# Patient Record
Sex: MALE | Race: WHITE | NOT HISPANIC OR LATINO | Employment: UNEMPLOYED | RURAL
[De-identification: names, ages, dates, MRNs, and addresses within clinical notes are randomized per-mention and may not be internally consistent; named-entity substitution may affect disease eponyms.]

---

## 2020-12-19 ENCOUNTER — HISTORICAL (OUTPATIENT)
Dept: ADMINISTRATIVE | Facility: HOSPITAL | Age: 50
End: 2020-12-19

## 2020-12-19 LAB
ALBUMIN SERPL BCP-MCNC: 3.2 G/DL (ref 3.5–5)
ALBUMIN/GLOB SERPL: 0.8 {RATIO}
ALP SERPL-CCNC: 124 U/L (ref 45–115)
ALT SERPL W P-5'-P-CCNC: 57 U/L (ref 16–61)
ANION GAP SERPL CALCULATED.3IONS-SCNC: 15 MMOL/L
AST SERPL W P-5'-P-CCNC: 33 U/L (ref 15–37)
BASOPHILS # BLD AUTO: 0.01 X10E3/UL (ref 0–0.2)
BASOPHILS NFR BLD AUTO: 0.2 % (ref 0–1)
BILIRUB SERPL-MCNC: 0.4 MG/DL (ref 0–1.2)
BUN SERPL-MCNC: 11 MG/DL (ref 7–18)
BUN/CREAT SERPL: 12.1
CALCIUM SERPL-MCNC: 8.3 MG/DL (ref 8.5–10.1)
CHLORIDE SERPL-SCNC: 99 MMOL/L (ref 98–107)
CO2 SERPL-SCNC: 25 MMOL/L (ref 21–32)
CREAT SERPL-MCNC: 0.91 MG/DL (ref 0.7–1.3)
EOSINOPHIL # BLD AUTO: 0.02 X10E3/UL (ref 0–0.5)
EOSINOPHIL NFR BLD AUTO: 0.3 % (ref 1–4)
ERYTHROCYTE [DISTWIDTH] IN BLOOD BY AUTOMATED COUNT: 11.6 % (ref 11.5–14.5)
GLOBULIN SER-MCNC: 3.9 G/DL (ref 2–4)
GLUCOSE SERPL-MCNC: 106 MG/DL (ref 74–106)
HCT VFR BLD AUTO: 39.9 % (ref 40–54)
HGB BLD-MCNC: 13.9 G/DL (ref 13.5–18)
IMM GRANULOCYTES # BLD AUTO: 0.07 X10E3/UL (ref 0–0.04)
IMM GRANULOCYTES NFR BLD: 1.1 % (ref 0–0.4)
LYMPHOCYTES # BLD AUTO: 0.77 X10E3/UL (ref 1–4.8)
LYMPHOCYTES NFR BLD AUTO: 11.9 % (ref 27–41)
MCH RBC QN AUTO: 31 PG (ref 27–31)
MCHC RBC AUTO-ENTMCNC: 34.8 G/DL (ref 32–36)
MCV RBC AUTO: 89.1 FL (ref 80–96)
MONOCYTES # BLD AUTO: 0.89 X10E3/UL (ref 0–0.8)
MONOCYTES NFR BLD AUTO: 13.7 % (ref 2–6)
MPC BLD CALC-MCNC: 9.9 FL (ref 9.4–12.4)
NEUTROPHILS # BLD AUTO: 4.73 X10E3/UL (ref 1.8–7.7)
NEUTROPHILS NFR BLD AUTO: 72.8 % (ref 53–65)
NRBC # BLD AUTO: 0 X10E3/UL (ref 0–0)
NRBC, AUTO (.00): 0 /100 (ref 0–0)
PLATELET # BLD AUTO: 251 X10E3/UL (ref 150–400)
POTASSIUM SERPL-SCNC: 3.6 MMOL/L (ref 3.5–5.1)
PROT SERPL-MCNC: 7.1 G/DL (ref 6.4–8.2)
RBC # BLD AUTO: 4.48 X10E6/UL (ref 4.6–6.2)
SODIUM SERPL-SCNC: 135 MMOL/L (ref 136–145)
WBC # BLD AUTO: 6.49 X10E3/UL (ref 4.5–11)

## 2022-05-09 ENCOUNTER — APPOINTMENT (OUTPATIENT)
Dept: RADIOLOGY | Facility: CLINIC | Age: 52
End: 2022-05-09
Attending: FAMILY MEDICINE
Payer: MEDICARE

## 2022-05-09 ENCOUNTER — OFFICE VISIT (OUTPATIENT)
Dept: FAMILY MEDICINE | Facility: CLINIC | Age: 52
End: 2022-05-09
Payer: MEDICARE

## 2022-05-09 VITALS
RESPIRATION RATE: 18 BRPM | OXYGEN SATURATION: 98 % | DIASTOLIC BLOOD PRESSURE: 70 MMHG | HEIGHT: 71 IN | HEART RATE: 61 BPM | SYSTOLIC BLOOD PRESSURE: 112 MMHG | TEMPERATURE: 98 F

## 2022-05-09 DIAGNOSIS — G89.29 CHRONIC MIDLINE LOW BACK PAIN WITH SCIATICA, SCIATICA LATERALITY UNSPECIFIED: ICD-10-CM

## 2022-05-09 DIAGNOSIS — M54.40 CHRONIC MIDLINE LOW BACK PAIN WITH SCIATICA, SCIATICA LATERALITY UNSPECIFIED: ICD-10-CM

## 2022-05-09 DIAGNOSIS — R07.9 CHEST PAIN, UNSPECIFIED TYPE: ICD-10-CM

## 2022-05-09 DIAGNOSIS — F32.A DEPRESSION, UNSPECIFIED DEPRESSION TYPE: ICD-10-CM

## 2022-05-09 DIAGNOSIS — R07.9 CHEST PAIN, UNSPECIFIED TYPE: Primary | ICD-10-CM

## 2022-05-09 DIAGNOSIS — Z12.11 SCREENING FOR MALIGNANT NEOPLASM OF COLON: ICD-10-CM

## 2022-05-09 LAB
EKG 12-LEAD: NORMAL
PR INTERVAL: NORMAL
PRT AXES: NORMAL
QRS DURATION: NORMAL
QT/QTC: NORMAL
VENTRICULAR RATE: NORMAL

## 2022-05-09 PROCEDURE — 71046 X-RAY EXAM CHEST 2 VIEWS: CPT | Mod: TC,RHCUB | Performed by: FAMILY MEDICINE

## 2022-05-09 PROCEDURE — 93010 ELECTROCARDIOGRAM REPORT: CPT | Mod: ,,, | Performed by: FAMILY MEDICINE

## 2022-05-09 PROCEDURE — 99214 PR OFFICE/OUTPT VISIT, EST, LEVL IV, 30-39 MIN: ICD-10-PCS | Mod: ,,, | Performed by: FAMILY MEDICINE

## 2022-05-09 PROCEDURE — 71046 XR CHEST PA AND LATERAL: ICD-10-PCS | Mod: 26,,, | Performed by: RADIOLOGY

## 2022-05-09 PROCEDURE — 93010 PR ELECTROCARDIOGRAM REPORT: ICD-10-PCS | Mod: ,,, | Performed by: FAMILY MEDICINE

## 2022-05-09 PROCEDURE — 96372 THER/PROPH/DIAG INJ SC/IM: CPT | Mod: ,,, | Performed by: FAMILY MEDICINE

## 2022-05-09 PROCEDURE — 99214 OFFICE O/P EST MOD 30 MIN: CPT | Mod: ,,, | Performed by: FAMILY MEDICINE

## 2022-05-09 PROCEDURE — 93005 ELECTROCARDIOGRAM TRACING: CPT | Mod: RHCUB | Performed by: FAMILY MEDICINE

## 2022-05-09 PROCEDURE — 71046 X-RAY EXAM CHEST 2 VIEWS: CPT | Mod: 26,,, | Performed by: RADIOLOGY

## 2022-05-09 PROCEDURE — 96372 PR INJECTION,THERAP/PROPH/DIAG2ST, IM OR SUBCUT: ICD-10-PCS | Mod: ,,, | Performed by: FAMILY MEDICINE

## 2022-05-09 RX ORDER — DULOXETIN HYDROCHLORIDE 30 MG/1
CAPSULE, DELAYED RELEASE ORAL
Qty: 60 CAPSULE | Refills: 1 | Status: SHIPPED | OUTPATIENT
Start: 2022-05-09 | End: 2023-07-11

## 2022-05-09 RX ORDER — KETOROLAC TROMETHAMINE 30 MG/ML
60 INJECTION, SOLUTION INTRAMUSCULAR; INTRAVENOUS
Status: COMPLETED | OUTPATIENT
Start: 2022-05-09 | End: 2022-05-09

## 2022-05-09 RX ORDER — DULOXETIN HYDROCHLORIDE 30 MG/1
CAPSULE, DELAYED RELEASE ORAL
Qty: 30 CAPSULE | Refills: 11 | Status: SHIPPED | OUTPATIENT
Start: 2022-05-09 | End: 2022-05-09

## 2022-05-09 RX ADMIN — KETOROLAC TROMETHAMINE 60 MG: 30 INJECTION, SOLUTION INTRAMUSCULAR; INTRAVENOUS at 10:05

## 2022-05-09 NOTE — PROGRESS NOTES
Silas German is a 52 y.o. male seen today for symptoms of increased depressed in and increased left-sided chest discomfort.  Patient reports certain movements will aggravate his chest discomfort and his EKG here in the office was negative.  In the past he is also had a negative cardiac evaluation.  Patient does suffer from chronic neck and lumbar disc disease.  Patient is planning a neurosurgery evaluation in [place].  Patient reports his chronic pain syndrome is making him very depressed and fatigued and he does agree for pain management evaluation today.  We discussed a trial of Cymbalta for his chronic depression and also hopefully to help with his pain syndrome.    Past Medical History:   Diagnosis Date    Depression     Hyperlipidemia     Insomnia      Family History   Problem Relation Age of Onset    Cancer Maternal Grandfather     Heart attack Maternal Grandfather      No current outpatient medications on file prior to visit.     No current facility-administered medications on file prior to visit.       There is no immunization history on file for this patient.    Review of Systems   Constitutional: Positive for malaise/fatigue. Negative for fever and weight loss.   Respiratory: Negative for wheezing.    Cardiovascular: Positive for chest pain. Negative for palpitations.   Gastrointestinal: Negative for nausea and vomiting.   Musculoskeletal: Positive for back pain, joint pain, myalgias and neck pain.   Psychiatric/Behavioral: Positive for depression. Negative for suicidal ideas. The patient is nervous/anxious.         Vitals:    05/09/22 0903   BP: 112/70   Pulse: 61   Resp: 18   Temp: 98.1 °F (36.7 °C)       Physical Exam  Vitals (Patient appears uncomfortable and tearful at times.) reviewed.   Constitutional:       General: He is in acute distress.   HENT:      Head: Normocephalic.   Cardiovascular:      Rate and Rhythm: Normal rate and regular rhythm.      Heart sounds: Normal heart sounds. No  murmur heard.    No friction rub. No gallop.   Pulmonary:      Effort: Pulmonary effort is normal.      Breath sounds: Normal breath sounds. No wheezing, rhonchi or rales.   Musculoskeletal:      Cervical back: Spasms and tenderness present. Decreased range of motion.      Lumbar back: Spasms and tenderness present. Decreased range of motion.        Back:    Skin:     General: Skin is warm and dry.   Neurological:      Mental Status: He is alert.      Cranial Nerves: No cranial nerve deficit.          Assessment and Plan  Chest pain, unspecified type  -     POCT EKG 12-LEAD (NOT FOR [facility] USE)  -     X-Ray Chest PA And Lateral; Future; Expected date: 05/09/2022  -     X-Ray Chest PA And Lateral; Future; Expected date: 05/09/2022  -     ketorolac injection 60 mg    Screening for malignant neoplasm of colon  -     Ambulatory referral/consult to Gastroenterology; Future; Expected date: 05/16/2022    Chronic midline low back pain with sciatica, sciatica laterality unspecified  -     Ambulatory referral/consult to Pain Clinic; Future; Expected date: 05/16/2022  -     ketorolac injection 60 mg    Depression, unspecified depression type  -     Discontinue: DULoxetine (CYMBALTA) 30 MG capsule; Take 1 po qd for 1 week then  Take 2 daily.  Dispense: 30 capsule; Refill: 11  -     DULoxetine (CYMBALTA) 30 MG capsule; Take 1 po qd for 1 week then  Take 2 daily.  Dispense: 60 capsule; Refill: 1            Return to clinic in 1 month.  The patient left before his x-ray was read.    Health Maintenance Topics with due status: Not Due       Topic Last Completion Date    Influenza Vaccine Not Due

## 2022-05-10 ENCOUNTER — LAB VISIT (OUTPATIENT)
Dept: LAB | Facility: CLINIC | Age: 52
End: 2022-05-10
Payer: MEDICARE

## 2022-05-10 DIAGNOSIS — Z71.89 COMPLEX CARE COORDINATION: ICD-10-CM

## 2022-05-10 DIAGNOSIS — R07.9 CHEST PAIN, UNSPECIFIED TYPE: Primary | ICD-10-CM

## 2022-05-10 DIAGNOSIS — R53.83 FATIGUE, UNSPECIFIED TYPE: ICD-10-CM

## 2022-05-10 LAB
ALBUMIN SERPL BCP-MCNC: 3.9 G/DL (ref 3.5–5)
ALBUMIN/GLOB SERPL: 1.1 {RATIO}
ALP SERPL-CCNC: 111 U/L (ref 45–115)
ALT SERPL W P-5'-P-CCNC: 51 U/L (ref 16–61)
ANION GAP SERPL CALCULATED.3IONS-SCNC: 14 MMOL/L (ref 7–16)
AST SERPL W P-5'-P-CCNC: 22 U/L (ref 15–37)
BASOPHILS # BLD AUTO: 0.03 K/UL (ref 0–0.2)
BASOPHILS NFR BLD AUTO: 0.3 % (ref 0–1)
BILIRUB SERPL-MCNC: 0.4 MG/DL (ref 0–1.2)
BUN SERPL-MCNC: 22 MG/DL (ref 7–18)
BUN/CREAT SERPL: 19 (ref 6–20)
CALCIUM SERPL-MCNC: 8.8 MG/DL (ref 8.5–10.1)
CHLORIDE SERPL-SCNC: 106 MMOL/L (ref 98–107)
CHOLEST SERPL-MCNC: 212 MG/DL (ref 0–200)
CHOLEST/HDLC SERPL: 3.8 {RATIO}
CO2 SERPL-SCNC: 24 MMOL/L (ref 21–32)
CREAT SERPL-MCNC: 1.13 MG/DL (ref 0.7–1.3)
DIFFERENTIAL METHOD BLD: ABNORMAL
EOSINOPHIL # BLD AUTO: 0.07 K/UL (ref 0–0.5)
EOSINOPHIL NFR BLD AUTO: 0.8 % (ref 1–4)
ERYTHROCYTE [DISTWIDTH] IN BLOOD BY AUTOMATED COUNT: 12.4 % (ref 11.5–14.5)
GLOBULIN SER-MCNC: 3.5 G/DL (ref 2–4)
GLUCOSE SERPL-MCNC: 97 MG/DL (ref 74–106)
HCT VFR BLD AUTO: 51.6 % (ref 40–54)
HDLC SERPL-MCNC: 56 MG/DL (ref 40–60)
HGB BLD-MCNC: 17.4 G/DL (ref 13.5–18)
IMM GRANULOCYTES # BLD AUTO: 0.09 K/UL (ref 0–0.04)
IMM GRANULOCYTES NFR BLD: 1 % (ref 0–0.4)
LDLC SERPL CALC-MCNC: 131 MG/DL
LDLC/HDLC SERPL: 2.3 {RATIO}
LYMPHOCYTES # BLD AUTO: 1.25 K/UL (ref 1–4.8)
LYMPHOCYTES NFR BLD AUTO: 14.3 % (ref 27–41)
MCH RBC QN AUTO: 31.2 PG (ref 27–31)
MCHC RBC AUTO-ENTMCNC: 33.7 G/DL (ref 32–36)
MCV RBC AUTO: 92.5 FL (ref 80–96)
MONOCYTES # BLD AUTO: 0.64 K/UL (ref 0–0.8)
MONOCYTES NFR BLD AUTO: 7.3 % (ref 2–6)
MPC BLD CALC-MCNC: 10.2 FL (ref 9.4–12.4)
NEUTROPHILS # BLD AUTO: 6.65 K/UL (ref 1.8–7.7)
NEUTROPHILS NFR BLD AUTO: 76.3 % (ref 53–65)
NONHDLC SERPL-MCNC: 156 MG/DL
NRBC # BLD AUTO: 0 X10E3/UL
NRBC, AUTO (.00): 0 %
PLATELET # BLD AUTO: 302 K/UL (ref 150–400)
POTASSIUM SERPL-SCNC: 5.4 MMOL/L (ref 3.5–5.1)
PROT SERPL-MCNC: 7.4 G/DL (ref 6.4–8.2)
RBC # BLD AUTO: 5.58 M/UL (ref 4.6–6.2)
SODIUM SERPL-SCNC: 139 MMOL/L (ref 136–145)
TRIGL SERPL-MCNC: 125 MG/DL (ref 35–150)
TSH SERPL DL<=0.005 MIU/L-ACNC: 1.17 UIU/ML (ref 0.36–3.74)
VLDLC SERPL-MCNC: 25 MG/DL
WBC # BLD AUTO: 8.73 K/UL (ref 4.5–11)

## 2022-05-10 PROCEDURE — 80053 COMPREHEN METABOLIC PANEL: CPT | Mod: ,,, | Performed by: CLINICAL MEDICAL LABORATORY

## 2022-05-10 PROCEDURE — 85025 COMPLETE CBC W/AUTO DIFF WBC: CPT | Mod: ,,, | Performed by: CLINICAL MEDICAL LABORATORY

## 2022-05-10 PROCEDURE — 80061 LIPID PANEL: CPT | Mod: ,,, | Performed by: CLINICAL MEDICAL LABORATORY

## 2022-05-10 PROCEDURE — 84402 ASSAY OF FREE TESTOSTERONE: CPT | Mod: 90,,, | Performed by: CLINICAL MEDICAL LABORATORY

## 2022-05-10 PROCEDURE — 84403 ASSAY OF TOTAL TESTOSTERONE: CPT | Mod: 90,,, | Performed by: CLINICAL MEDICAL LABORATORY

## 2022-05-10 PROCEDURE — 84403 TESTOSTERONE, FREE (DIALYSIS) AND TOTAL, LC/MS/MS: ICD-10-PCS | Mod: 90,,, | Performed by: CLINICAL MEDICAL LABORATORY

## 2022-05-10 PROCEDURE — 36415 COLL VENOUS BLD VENIPUNCTURE: CPT

## 2022-05-10 PROCEDURE — 84443 TSH: ICD-10-PCS | Mod: ,,, | Performed by: CLINICAL MEDICAL LABORATORY

## 2022-05-10 PROCEDURE — 84402 TESTOSTERONE, FREE (DIALYSIS) AND TOTAL, LC/MS/MS: ICD-10-PCS | Mod: 90,,, | Performed by: CLINICAL MEDICAL LABORATORY

## 2022-05-10 PROCEDURE — 85025 CBC WITH DIFFERENTIAL: ICD-10-PCS | Mod: ,,, | Performed by: CLINICAL MEDICAL LABORATORY

## 2022-05-10 PROCEDURE — 80053 COMPREHENSIVE METABOLIC PANEL: ICD-10-PCS | Mod: ,,, | Performed by: CLINICAL MEDICAL LABORATORY

## 2022-05-10 PROCEDURE — 84443 ASSAY THYROID STIM HORMONE: CPT | Mod: ,,, | Performed by: CLINICAL MEDICAL LABORATORY

## 2022-05-10 PROCEDURE — 80061 LIPID PANEL: ICD-10-PCS | Mod: ,,, | Performed by: CLINICAL MEDICAL LABORATORY

## 2022-05-12 ENCOUNTER — TELEPHONE (OUTPATIENT)
Dept: FAMILY MEDICINE | Facility: CLINIC | Age: 52
End: 2022-05-12
Payer: MEDICARE

## 2022-05-12 NOTE — TELEPHONE ENCOUNTER
----- Message from Darion Grant MD sent at 5/9/2022 12:45 PM CDT -----  He is normal lungs and heart but has degenerative changes present in the thoracic spine

## 2022-05-14 LAB
TESTOST FREE SERPL-MCNC: 12.1 NG/DL (ref 4.06–15.6)
TESTOST SERPL-MCNC: 525 NG/DL (ref 240–950)

## 2022-06-15 ENCOUNTER — TELEPHONE (OUTPATIENT)
Dept: FAMILY MEDICINE | Facility: CLINIC | Age: 52
End: 2022-06-15
Payer: MEDICARE

## 2022-06-15 NOTE — TELEPHONE ENCOUNTER
----- Message from Darion Grant MD sent at 5/27/2022  8:03 AM CDT -----  Testosterone studies are normal

## 2022-12-09 DIAGNOSIS — Z71.89 COMPLEX CARE COORDINATION: ICD-10-CM

## 2023-07-11 ENCOUNTER — OFFICE VISIT (OUTPATIENT)
Dept: FAMILY MEDICINE | Facility: CLINIC | Age: 53
End: 2023-07-11
Payer: MEDICARE

## 2023-07-11 VITALS
BODY MASS INDEX: 31.59 KG/M2 | RESPIRATION RATE: 16 BRPM | SYSTOLIC BLOOD PRESSURE: 134 MMHG | HEIGHT: 71 IN | HEART RATE: 74 BPM | OXYGEN SATURATION: 98 % | TEMPERATURE: 98 F | DIASTOLIC BLOOD PRESSURE: 90 MMHG | WEIGHT: 225.63 LBS

## 2023-07-11 DIAGNOSIS — Z13.220 LIPID SCREENING: ICD-10-CM

## 2023-07-11 DIAGNOSIS — R00.2 PALPITATIONS: ICD-10-CM

## 2023-07-11 DIAGNOSIS — Z12.11 COLON CANCER SCREENING: ICD-10-CM

## 2023-07-11 DIAGNOSIS — R56.9 SEIZURES: ICD-10-CM

## 2023-07-11 DIAGNOSIS — Z13.29 THYROID DISORDER SCREENING: ICD-10-CM

## 2023-07-11 DIAGNOSIS — Z11.59 NEED FOR HEPATITIS C SCREENING TEST: Primary | ICD-10-CM

## 2023-07-11 DIAGNOSIS — E78.5 HYPERLIPIDEMIA, UNSPECIFIED HYPERLIPIDEMIA TYPE: ICD-10-CM

## 2023-07-11 LAB
ALBUMIN SERPL BCP-MCNC: 4 G/DL (ref 3.5–5)
ALBUMIN/GLOB SERPL: 1.3 {RATIO}
ALP SERPL-CCNC: 106 U/L (ref 45–115)
ALT SERPL W P-5'-P-CCNC: 27 U/L (ref 16–61)
ANION GAP SERPL CALCULATED.3IONS-SCNC: 12 MMOL/L (ref 7–16)
AST SERPL W P-5'-P-CCNC: 23 U/L (ref 15–37)
BASOPHILS # BLD AUTO: 0.04 K/UL (ref 0–0.2)
BASOPHILS NFR BLD AUTO: 0.6 % (ref 0–1)
BILIRUB SERPL-MCNC: 0.3 MG/DL (ref ?–1.2)
BUN SERPL-MCNC: 21 MG/DL (ref 7–18)
BUN/CREAT SERPL: 21 (ref 6–20)
CALCIUM SERPL-MCNC: 8.5 MG/DL (ref 8.5–10.1)
CHLORIDE SERPL-SCNC: 108 MMOL/L (ref 98–107)
CHOLEST SERPL-MCNC: 229 MG/DL (ref 0–200)
CHOLEST/HDLC SERPL: 4.2 {RATIO}
CO2 SERPL-SCNC: 24 MMOL/L (ref 21–32)
CREAT SERPL-MCNC: 1.01 MG/DL (ref 0.7–1.3)
DIFFERENTIAL METHOD BLD: ABNORMAL
EGFR (NO RACE VARIABLE) (RUSH/TITUS): 89 ML/MIN/1.73M2
EOSINOPHIL # BLD AUTO: 0.19 K/UL (ref 0–0.5)
EOSINOPHIL NFR BLD AUTO: 3 % (ref 1–4)
ERYTHROCYTE [DISTWIDTH] IN BLOOD BY AUTOMATED COUNT: 12.5 % (ref 11.5–14.5)
GLOBULIN SER-MCNC: 3.1 G/DL (ref 2–4)
GLUCOSE SERPL-MCNC: 78 MG/DL (ref 74–106)
HCT VFR BLD AUTO: 45.5 % (ref 40–54)
HCV AB SER QL: NORMAL
HDLC SERPL-MCNC: 55 MG/DL (ref 40–60)
HGB BLD-MCNC: 15.1 G/DL (ref 13.5–18)
IMM GRANULOCYTES # BLD AUTO: 0.05 K/UL (ref 0–0.04)
IMM GRANULOCYTES NFR BLD: 0.8 % (ref 0–0.4)
LDLC SERPL CALC-MCNC: 150 MG/DL
LDLC/HDLC SERPL: 2.7 {RATIO}
LYMPHOCYTES # BLD AUTO: 1.19 K/UL (ref 1–4.8)
LYMPHOCYTES NFR BLD AUTO: 18.7 % (ref 27–41)
MCH RBC QN AUTO: 30 PG (ref 27–31)
MCHC RBC AUTO-ENTMCNC: 33.2 G/DL (ref 32–36)
MCV RBC AUTO: 90.3 FL (ref 80–96)
MONOCYTES # BLD AUTO: 0.58 K/UL (ref 0–0.8)
MONOCYTES NFR BLD AUTO: 9.1 % (ref 2–6)
MPC BLD CALC-MCNC: 9.8 FL (ref 9.4–12.4)
NEUTROPHILS # BLD AUTO: 4.3 K/UL (ref 1.8–7.7)
NEUTROPHILS NFR BLD AUTO: 67.8 % (ref 53–65)
NONHDLC SERPL-MCNC: 174 MG/DL
NRBC # BLD AUTO: 0 X10E3/UL
NRBC, AUTO (.00): 0 %
PLATELET # BLD AUTO: 249 K/UL (ref 150–400)
POTASSIUM SERPL-SCNC: 4.2 MMOL/L (ref 3.5–5.1)
PROT SERPL-MCNC: 7.1 G/DL (ref 6.4–8.2)
RBC # BLD AUTO: 5.04 M/UL (ref 4.6–6.2)
SODIUM SERPL-SCNC: 140 MMOL/L (ref 136–145)
TRIGL SERPL-MCNC: 120 MG/DL (ref 35–150)
TSH SERPL DL<=0.005 MIU/L-ACNC: 1.96 UIU/ML (ref 0.36–3.74)
VLDLC SERPL-MCNC: 24 MG/DL
WBC # BLD AUTO: 6.35 K/UL (ref 4.5–11)

## 2023-07-11 PROCEDURE — 80061 LIPID PANEL: ICD-10-PCS | Mod: ,,, | Performed by: CLINICAL MEDICAL LABORATORY

## 2023-07-11 PROCEDURE — 85025 COMPLETE CBC W/AUTO DIFF WBC: CPT | Mod: ,,, | Performed by: CLINICAL MEDICAL LABORATORY

## 2023-07-11 PROCEDURE — 86803 HEPATITIS C ANTIBODY: ICD-10-PCS | Mod: ,,, | Performed by: CLINICAL MEDICAL LABORATORY

## 2023-07-11 PROCEDURE — 80053 COMPREHEN METABOLIC PANEL: CPT | Mod: ,,, | Performed by: CLINICAL MEDICAL LABORATORY

## 2023-07-11 PROCEDURE — 80053 COMPREHENSIVE METABOLIC PANEL: ICD-10-PCS | Mod: ,,, | Performed by: CLINICAL MEDICAL LABORATORY

## 2023-07-11 PROCEDURE — 99213 PR OFFICE/OUTPT VISIT, EST, LEVL III, 20-29 MIN: ICD-10-PCS | Mod: ,,, | Performed by: FAMILY MEDICINE

## 2023-07-11 PROCEDURE — 84443 ASSAY THYROID STIM HORMONE: CPT | Mod: ,,, | Performed by: CLINICAL MEDICAL LABORATORY

## 2023-07-11 PROCEDURE — 86803 HEPATITIS C AB TEST: CPT | Mod: ,,, | Performed by: CLINICAL MEDICAL LABORATORY

## 2023-07-11 PROCEDURE — 99213 OFFICE O/P EST LOW 20 MIN: CPT | Mod: ,,, | Performed by: FAMILY MEDICINE

## 2023-07-11 PROCEDURE — 80061 LIPID PANEL: CPT | Mod: ,,, | Performed by: CLINICAL MEDICAL LABORATORY

## 2023-07-11 PROCEDURE — 85025 CBC WITH DIFFERENTIAL: ICD-10-PCS | Mod: ,,, | Performed by: CLINICAL MEDICAL LABORATORY

## 2023-07-11 PROCEDURE — 84443 TSH: ICD-10-PCS | Mod: ,,, | Performed by: CLINICAL MEDICAL LABORATORY

## 2023-07-11 RX ORDER — NEOMYCIN SULFATE, POLYMYXIN B SULFATE AND DEXAMETHASONE 3.5; 10000; 1 MG/ML; [USP'U]/ML; MG/ML
1 SUSPENSION/ DROPS OPHTHALMIC 3 TIMES DAILY
COMMUNITY
Start: 2023-07-07 | End: 2023-07-11

## 2023-07-11 NOTE — PROGRESS NOTES
"      Yajaira Mejias MD MPH  [address], [place] [address]  Phone: [phone]     Subjective     Name: Silas German   YOB: 1970 (53 y.o.)  MRN: [id number]  Visit Date: 7/11/2023   Chief Complaint: Establish Care        HISTORY OF PRESENT ILLNESS:  Patient is 53 years male. He came to the office to establish care. He has past medical history of hyperlipidemia and depression. He is currently not taking any medications. He reported few seizure like episodes in recent past for that he didn't seek medical help. I have referred him to a neurologist for work up and counseled him not to drive any vehicle. He is also complaining of numbness in plantar aspect of both his feet. He denies any weakness. He has history of thyroid disease for which he has not seen any doctor for more than a decade. Patient has been having problem with his stools lately, he says that his stool is not formed and is "pasty" in consistency. He denies any abdominal pain, blood in stool, or alternating stool pattern.         PAST MEDICAL HISTORY:  Significant Diagnoses - Patient  has a past medical history of Depression, Hyperlipidemia, and Insomnia.  Medications - Patient is not on any long-term medications.   Allergies - Patient has No Known Allergies.  Surgeries - Patient  has a past surgical history that includes Hand surgery and Tonsillectomy.  Family History - Patient family history includes Cancer in his maternal grandfather; Heart attack in his maternal grandfather.      SOCIAL HISTORY:  Tobacco - Patient  reports that he has quit smoking. His smoking use included cigarettes. He has never used smokeless tobacco.   Alcohol - Patient  reports that he does not currently use alcohol.   Recreational Drugs - Patient  reports that he does not currently use drugs after having used the following drugs: Methamphetamines.       Review of Systems   Constitutional: Negative.    HENT: Negative.     Eyes: Negative.    Respiratory: " " Negative for cough and shortness of breath.    Cardiovascular:  Positive for palpitations. Negative for chest pain and leg swelling.   Gastrointestinal:  Positive for change in bowel habit and change in bowel habit. Negative for abdominal pain.   Endocrine: Negative for cold intolerance, heat intolerance, polydipsia and polyuria.   Genitourinary:  Negative for decreased urine volume, difficulty urinating, dysuria and urgency.   Musculoskeletal:  Positive for arthralgias.   Allergic/Immunologic: Negative.    Neurological:  Positive for seizures and numbness. Negative for dizziness, vertigo, tremors, syncope, weakness, light-headedness, headaches, coordination difficulties and coordination difficulties.   Psychiatric/Behavioral:  Negative for agitation, behavioral problems, confusion, decreased concentration and sleep disturbance. The patient is not nervous/anxious and is not hyperactive.         Past Medical History:   Diagnosis Date    Depression     Hyperlipidemia     Insomnia         Review of patient's allergies indicates:  No Known Allergies     Past Surgical History:   Procedure Laterality Date    HAND SURGERY      TONSILLECTOMY          Family History   Problem Relation Age of Onset    Cancer Maternal Grandfather     Heart attack Maternal Grandfather        No current outpatient medications       Objective     BP (!) 134/90 (BP Location: Left arm, Patient Position: Sitting, BP Method: Large (Automatic))   Pulse 74   Temp 98.2 °F (36.8 °C) (Temporal)   Resp 16   Ht 5' 11" (1.803 m)   Wt 102.3 kg (225 lb 9.6 oz)   SpO2 98%   BMI 31.46 kg/m²     Physical Exam  Constitutional:       Appearance: Normal appearance. He is obese.   HENT:      Head: Normocephalic and atraumatic.      Mouth/Throat:      Mouth: Mucous membranes are moist.   Eyes:      Extraocular Movements: Extraocular movements intact.      Pupils: Pupils are equal, round, and reactive to light.   Cardiovascular:      Rate and Rhythm: Normal " rate and regular rhythm.      Pulses: Normal pulses.      Heart sounds: Normal heart sounds.   Pulmonary:      Effort: Pulmonary effort is normal.      Breath sounds: Normal breath sounds.   Abdominal:      Palpations: Abdomen is soft.   Musculoskeletal:         General: Normal range of motion.        Feet:    Feet:      Comments: Numbness and tingling sensation  Neurological:      General: No focal deficit present.      Mental Status: He is alert.   Psychiatric:         Mood and Affect: Mood normal.         Behavior: Behavior normal.        All recently obtained labs have been reviewed and discussed in detail with the patient.   Assessment     1. Need for hepatitis C screening test    2. Lipid screening    3. Palpitations    4. Colon cancer screening    5. Thyroid disorder screening    6. Seizures    7. Hyperlipidemia, unspecified hyperlipidemia type         Plan     Problem List Items Addressed This Visit    None  Visit Diagnoses       Need for hepatitis C screening test    -  Primary    Relevant Orders    Hepatitis C Antibody    Lipid screening        Relevant Orders    Lipid Panel    Palpitations        Relevant Orders    Comprehensive Metabolic Panel    TSH    CBC Auto Differential    Colon cancer screening        Relevant Orders    Ambulatory referral/consult to Gastroenterology    Thyroid disorder screening        Seizures        Relevant Orders    Ambulatory referral/consult to Neurology    Hyperlipidemia, unspecified hyperlipidemia type        Relevant Orders    Lipid Panel              All orders:  Orders Placed This Encounter    Hepatitis C Antibody    Lipid Panel    Comprehensive Metabolic Panel    TSH    CBC Auto Differential    CBC with Differential    Ambulatory referral/consult to Gastroenterology    Ambulatory referral/consult to Neurology          Follow up in about 2 weeks (around 7/25/2023).    Yajaira Mejias MD MPH   [employer]

## 2023-07-20 DIAGNOSIS — Z12.11 SCREENING FOR COLON CANCER: Primary | ICD-10-CM

## 2023-07-20 DIAGNOSIS — Z12.11 COLON CANCER SCREENING: Primary | ICD-10-CM

## 2023-07-20 RX ORDER — POLYETHYLENE GLYCOL 3350, SODIUM SULFATE ANHYDROUS, SODIUM BICARBONATE, SODIUM CHLORIDE, POTASSIUM CHLORIDE 236; 22.74; 6.74; 5.86; 2.97 G/4L; G/4L; G/4L; G/4L; G/4L
4 POWDER, FOR SOLUTION ORAL ONCE
Qty: 4000 ML | Refills: 0 | Status: SHIPPED | OUTPATIENT
Start: 2023-07-20 | End: 2023-07-20

## 2023-08-09 DIAGNOSIS — Z71.89 COMPLEX CARE COORDINATION: ICD-10-CM

## 2024-03-09 DIAGNOSIS — Z71.89 COMPLEX CARE COORDINATION: ICD-10-CM

## 2024-08-15 ENCOUNTER — OFFICE VISIT (OUTPATIENT)
Dept: FAMILY MEDICINE | Facility: CLINIC | Age: 54
End: 2024-08-15
Payer: MEDICARE

## 2024-08-15 ENCOUNTER — APPOINTMENT (OUTPATIENT)
Dept: RADIOLOGY | Facility: CLINIC | Age: 54
End: 2024-08-15
Attending: NURSE PRACTITIONER
Payer: MEDICARE

## 2024-08-15 VITALS
TEMPERATURE: 98 F | HEART RATE: 76 BPM | OXYGEN SATURATION: 98 % | RESPIRATION RATE: 18 BRPM | BODY MASS INDEX: 31.22 KG/M2 | SYSTOLIC BLOOD PRESSURE: 135 MMHG | DIASTOLIC BLOOD PRESSURE: 82 MMHG | HEIGHT: 71 IN | WEIGHT: 223 LBS

## 2024-08-15 DIAGNOSIS — R60.9 SWELLING: ICD-10-CM

## 2024-08-15 DIAGNOSIS — E16.2 HYPOGLYCEMIA, UNSPECIFIED: ICD-10-CM

## 2024-08-15 DIAGNOSIS — E78.5 HYPERLIPIDEMIA, UNSPECIFIED HYPERLIPIDEMIA TYPE: ICD-10-CM

## 2024-08-15 DIAGNOSIS — R20.0 NUMBNESS: ICD-10-CM

## 2024-08-15 DIAGNOSIS — R60.9 SWELLING: Primary | ICD-10-CM

## 2024-08-15 DIAGNOSIS — Z13.29 THYROID DISORDER SCREENING: ICD-10-CM

## 2024-08-15 LAB
ALBUMIN SERPL BCP-MCNC: 3.9 G/DL (ref 3.5–5)
ALBUMIN/GLOB SERPL: 1.4 {RATIO}
ALP SERPL-CCNC: 107 U/L (ref 45–115)
ALT SERPL W P-5'-P-CCNC: 33 U/L (ref 16–61)
ANION GAP SERPL CALCULATED.3IONS-SCNC: 7 MMOL/L (ref 7–16)
AST SERPL W P-5'-P-CCNC: 19 U/L (ref 15–37)
BASOPHILS # BLD AUTO: 0.03 K/UL (ref 0–0.2)
BASOPHILS NFR BLD AUTO: 0.5 % (ref 0–1)
BILIRUB SERPL-MCNC: 0.4 MG/DL (ref ?–1.2)
BUN SERPL-MCNC: 15 MG/DL (ref 7–18)
BUN/CREAT SERPL: 15 (ref 6–20)
CALCIUM SERPL-MCNC: 8.8 MG/DL (ref 8.5–10.1)
CHLORIDE SERPL-SCNC: 106 MMOL/L (ref 98–107)
CHOLEST SERPL-MCNC: 166 MG/DL (ref 0–200)
CHOLEST/HDLC SERPL: 3.6 {RATIO}
CO2 SERPL-SCNC: 28 MMOL/L (ref 21–32)
CREAT SERPL-MCNC: 1.03 MG/DL (ref 0.7–1.3)
DIFFERENTIAL METHOD BLD: ABNORMAL
EGFR (NO RACE VARIABLE) (RUSH/TITUS): 86 ML/MIN/1.73M2
EOSINOPHIL # BLD AUTO: 0.16 K/UL (ref 0–0.5)
EOSINOPHIL NFR BLD AUTO: 2.5 % (ref 1–4)
ERYTHROCYTE [DISTWIDTH] IN BLOOD BY AUTOMATED COUNT: 12.9 % (ref 11.5–14.5)
EST. AVERAGE GLUCOSE BLD GHB EST-MCNC: 117 MG/DL
GLOBULIN SER-MCNC: 2.8 G/DL (ref 2–4)
GLUCOSE SERPL-MCNC: 117 MG/DL (ref 74–106)
HBA1C MFR BLD HPLC: 5.7 % (ref 4.5–6.6)
HCT VFR BLD AUTO: 43.7 % (ref 40–54)
HDLC SERPL-MCNC: 46 MG/DL (ref 40–60)
HGB BLD-MCNC: 14 G/DL (ref 13.5–18)
IMM GRANULOCYTES # BLD AUTO: 0.05 K/UL (ref 0–0.04)
IMM GRANULOCYTES NFR BLD: 0.8 % (ref 0–0.4)
LDLC SERPL CALC-MCNC: 91 MG/DL
LDLC/HDLC SERPL: 2 {RATIO}
LYMPHOCYTES # BLD AUTO: 1.29 K/UL (ref 1–4.8)
LYMPHOCYTES NFR BLD AUTO: 20 % (ref 27–41)
MCH RBC QN AUTO: 30.2 PG (ref 27–31)
MCHC RBC AUTO-ENTMCNC: 32 G/DL (ref 32–36)
MCV RBC AUTO: 94.4 FL (ref 80–96)
MONOCYTES # BLD AUTO: 0.68 K/UL (ref 0–0.8)
MONOCYTES NFR BLD AUTO: 10.6 % (ref 2–6)
MPC BLD CALC-MCNC: 9.7 FL (ref 9.4–12.4)
NEUTROPHILS # BLD AUTO: 4.23 K/UL (ref 1.8–7.7)
NEUTROPHILS NFR BLD AUTO: 65.6 % (ref 53–65)
NONHDLC SERPL-MCNC: 120 MG/DL
NRBC # BLD AUTO: 0 X10E3/UL
NRBC, AUTO (.00): 0 %
PLATELET # BLD AUTO: 257 K/UL (ref 150–400)
POTASSIUM SERPL-SCNC: 4.1 MMOL/L (ref 3.5–5.1)
PROT SERPL-MCNC: 6.7 G/DL (ref 6.4–8.2)
RBC # BLD AUTO: 4.63 M/UL (ref 4.6–6.2)
SODIUM SERPL-SCNC: 137 MMOL/L (ref 136–145)
T3FREE SERPL-MCNC: 3 PG/ML (ref 2.18–3.98)
T4 FREE SERPL-MCNC: 0.85 NG/DL (ref 0.76–1.46)
TRIGL SERPL-MCNC: 144 MG/DL (ref 35–150)
TSH SERPL DL<=0.005 MIU/L-ACNC: 1.91 UIU/ML (ref 0.36–3.74)
VLDLC SERPL-MCNC: 29 MG/DL
WBC # BLD AUTO: 6.44 K/UL (ref 4.5–11)

## 2024-08-15 PROCEDURE — 84443 ASSAY THYROID STIM HORMONE: CPT | Mod: ,,, | Performed by: CLINICAL MEDICAL LABORATORY

## 2024-08-15 PROCEDURE — 72100 X-RAY EXAM L-S SPINE 2/3 VWS: CPT | Mod: 26,,, | Performed by: RADIOLOGY

## 2024-08-15 PROCEDURE — 80061 LIPID PANEL: CPT | Mod: ,,, | Performed by: CLINICAL MEDICAL LABORATORY

## 2024-08-15 PROCEDURE — 3075F SYST BP GE 130 - 139MM HG: CPT | Mod: ,,, | Performed by: NURSE PRACTITIONER

## 2024-08-15 PROCEDURE — 3044F HG A1C LEVEL LT 7.0%: CPT | Mod: ,,, | Performed by: NURSE PRACTITIONER

## 2024-08-15 PROCEDURE — 1159F MED LIST DOCD IN RCRD: CPT | Mod: ,,, | Performed by: NURSE PRACTITIONER

## 2024-08-15 PROCEDURE — 72040 X-RAY EXAM NECK SPINE 2-3 VW: CPT | Mod: 26,,, | Performed by: RADIOLOGY

## 2024-08-15 PROCEDURE — 3079F DIAST BP 80-89 MM HG: CPT | Mod: ,,, | Performed by: NURSE PRACTITIONER

## 2024-08-15 PROCEDURE — 1160F RVW MEDS BY RX/DR IN RCRD: CPT | Mod: ,,, | Performed by: NURSE PRACTITIONER

## 2024-08-15 PROCEDURE — 80053 COMPREHEN METABOLIC PANEL: CPT | Mod: ,,, | Performed by: CLINICAL MEDICAL LABORATORY

## 2024-08-15 PROCEDURE — 85025 COMPLETE CBC W/AUTO DIFF WBC: CPT | Mod: ,,, | Performed by: CLINICAL MEDICAL LABORATORY

## 2024-08-15 PROCEDURE — 72100 X-RAY EXAM L-S SPINE 2/3 VWS: CPT | Mod: TC,RHCUB | Performed by: NURSE PRACTITIONER

## 2024-08-15 PROCEDURE — 3008F BODY MASS INDEX DOCD: CPT | Mod: ,,, | Performed by: NURSE PRACTITIONER

## 2024-08-15 PROCEDURE — 83036 HEMOGLOBIN GLYCOSYLATED A1C: CPT | Mod: ,,, | Performed by: CLINICAL MEDICAL LABORATORY

## 2024-08-15 PROCEDURE — 99214 OFFICE O/P EST MOD 30 MIN: CPT | Mod: ,,, | Performed by: NURSE PRACTITIONER

## 2024-08-15 PROCEDURE — 84439 ASSAY OF FREE THYROXINE: CPT | Mod: ,,, | Performed by: CLINICAL MEDICAL LABORATORY

## 2024-08-15 PROCEDURE — 84481 FREE ASSAY (FT-3): CPT | Mod: ,,, | Performed by: CLINICAL MEDICAL LABORATORY

## 2024-08-15 PROCEDURE — 72040 X-RAY EXAM NECK SPINE 2-3 VW: CPT | Mod: TC,RHCUB | Performed by: NURSE PRACTITIONER

## 2024-08-15 NOTE — PROGRESS NOTES
[facility]    Chief Complaint      Chief Complaint   Patient presents with    Edema     In hands and feet with some edema,     Numbness       History of Present Illness      Silas German is a 54 y.o. male. He  has a past medical history of Depression, Hyperlipidemia, and Insomnia., who presents today for c/o swelling in his hands and feet with intermittent numbness.  Has not been to the doctor in years.  States he has a history of Thyroid disease- went to a specialist in [place] years ago and was on medication but he stopped it.  He also states he quit taking his statin medication.     Past Medical History:  Past Medical History:   Diagnosis Date    Depression     Hyperlipidemia     Insomnia        Past Surgical History:   has a past surgical history that includes Hand surgery and Tonsillectomy.    Social History:  Social History     Tobacco Use    Smoking status: Former     Types: Cigarettes     Passive exposure: Past    Smokeless tobacco: Never   Substance Use Topics    Alcohol use: Not Currently    Drug use: Not Currently     Types: Methamphetamines     Comment: smoked crystal meth       I personally reviewed all past medical, surgical, and social.     Review of Systems   Constitutional:  Negative for unexpected weight change.   Eyes:  Negative for visual disturbance.   Respiratory:  Negative for shortness of breath.    Cardiovascular: Negative.    Gastrointestinal:  Negative for abdominal pain.   Musculoskeletal:  Positive for arthralgias, back pain and neck pain. Negative for gait problem.   Skin: Negative.    Neurological:  Positive for numbness. Negative for weakness and headaches.        Medications:  No outpatient encounter medications on file as of 8/15/2024.     No facility-administered encounter medications on file as of 8/15/2024.       Allergies:  Review of patient's allergies indicates:  No Known Allergies    Health Maintenance:    There is no immunization history on file for this  "patient.   Health Maintenance   Topic Date Due    TETANUS VACCINE  Never done    Colorectal Cancer Screening  Never done    Shingles Vaccine (1 of 2) Never done    Lipid Panel  08/15/2025    Hepatitis C Screening  Completed        Physical Exam      Vital Signs  Temp: 98.4 °F (36.9 °C)  Temp Source: Oral  Pulse: 76  Resp: 18  SpO2: 98 %  BP: 135/82  BP Location: Left arm  Patient Position: Sitting  Pain Score: 0-No pain  Height and Weight  Height: 5' 11" (180.3 cm)  Weight: 101.2 kg (223 lb)  BSA (Calculated - sq m): 2.25 sq meters  BMI (Calculated): 31.1  Weight in (lb) to have BMI = 25: 178.9]    Physical Exam  Vitals and nursing note reviewed.   Constitutional:       Appearance: Normal appearance.   HENT:      Head: Normocephalic.      Right Ear: Hearing normal.      Left Ear: Hearing normal.      Nose: Nose normal.   Eyes:      General: Lids are normal.      Conjunctiva/sclera: Conjunctivae normal.   Neck:      Thyroid: No thyromegaly.   Cardiovascular:      Rate and Rhythm: Normal rate and regular rhythm.      Heart sounds: Normal heart sounds.   Pulmonary:      Effort: Pulmonary effort is normal.      Breath sounds: Normal breath sounds.   Musculoskeletal:         General: Swelling and tenderness present. Normal range of motion.      Cervical back: Normal range of motion and neck supple.      Right lower leg: No edema.      Left lower leg: No edema.   Skin:     General: Skin is warm and dry.   Neurological:      General: No focal deficit present.      Mental Status: He is alert and oriented to person, place, and time.      Gait: Gait is intact.          Laboratory:  CBC:  Recent Labs   Lab 05/10/22  1155 07/11/23  1142 08/15/24  1106   WBC 8.73 6.35 6.44   RBC 5.58 5.04 4.63   Hemoglobin 17.4 15.1 14.0   Hematocrit 51.6 45.5 43.7   Platelet Count 302 249 257   MCV 92.5 90.3 94.4   MCH 31.2 H 30.0 30.2   MCHC 33.7 33.2 32.0     CMP:  Recent Labs   Lab 05/10/22  1155 07/11/23  1142 08/15/24  1106   Glucose 97 78 " 117 H   Calcium 8.8 8.5 8.8   Albumin 3.9 4.0 3.9   Total Protein 7.4 7.1 6.7   Sodium 139 140 137   Potassium 5.4 H 4.2 4.1   CO2 24 24 28   Chloride 106 108 H 106   BUN 22 H 21 H 15   Alk Phos 111 106 107   ALT 51 27 33   AST 22 23 19   Bilirubin, Total 0.4 0.3 0.4     LIPIDS:  Recent Labs   Lab 05/10/22  1155 07/11/23  1142 08/15/24  1106   TSH 1.170 1.960 1.910   HDL Cholesterol 56 55 46   Cholesterol 212 H 229 H 166   Triglycerides 125 120 144   LDL Calculated 131 150 91   Cholesterol/HDL Ratio (Risk Factor) 3.8 4.2 3.6   Non- 174 120     TSH:  Recent Labs   Lab 05/10/22  1155 07/11/23  1142 08/15/24  1106   TSH 1.170 1.960 1.910     A1C:  Recent Labs   Lab 08/15/24  1106   Hemoglobin A1C 5.7       Assessment/Plan     Silas German is a 54 y.o.male with:     1. Swelling  -     CBC Auto Differential; Future; Expected date: 08/15/2024  -     Comprehensive Metabolic Panel; Future; Expected date: 08/15/2024  -     X-Ray Lumbar Spine AP And Lateral; Future; Expected date: 08/15/2024  -     X-Ray Cervical Spine AP And Lateral; Future; Expected date: 08/15/2024    2. Numbness  -     CBC Auto Differential; Future; Expected date: 08/15/2024  -     Comprehensive Metabolic Panel; Future; Expected date: 08/15/2024  -     Hemoglobin A1C; Future; Expected date: 08/15/2024  -     Thyroid Panel; Future; Expected date: 08/15/2024  -     T3, Free; Future; Expected date: 08/15/2024  -     X-Ray Lumbar Spine AP And Lateral; Future; Expected date: 08/15/2024  -     X-Ray Cervical Spine AP And Lateral; Future; Expected date: 08/15/2024    3. Thyroid disorder screening  -     Thyroid Panel; Future; Expected date: 08/15/2024  -     T3, Free; Future; Expected date: 08/15/2024    4. Hyperlipidemia, unspecified hyperlipidemia type  -     Lipid Panel; Future; Expected date: 08/15/2024    5. Hypoglycemia, unspecified  -     Hemoglobin A1C; Future; Expected date: 08/15/2024       Will call with lab and xray results    Total time  spent face-to-face and non-face-to-face coordinating care for this encounter was: 30 minutes     Chronic conditions status updated as per HPI.  Other than changes above, cont current medications and maintain follow up with specialists.  Return to clinic prn if symptoms worsen or fail to improve.    Taty Veronica, JINGP  [facility]

## 2024-08-21 ENCOUNTER — TELEPHONE (OUTPATIENT)
Dept: FAMILY MEDICINE | Facility: CLINIC | Age: 54
End: 2024-08-21
Payer: MEDICARE

## 2024-08-21 DIAGNOSIS — M51.36 DEGENERATIVE DISC DISEASE, LUMBAR: Primary | ICD-10-CM

## 2024-08-21 DIAGNOSIS — M50.30 DEGENERATIVE DISC DISEASE, CERVICAL: ICD-10-CM

## 2024-08-21 NOTE — TELEPHONE ENCOUNTER
Patient notified that Patient had moderate degenerative disc disease in his neck. Would recommend referral to Dr. Kelley. Patient verbalized understanding and referral was placed.

## 2024-08-21 NOTE — TELEPHONE ENCOUNTER
----- Message from ROSA MARIA Gomez sent at 8/20/2024  7:17 PM CDT -----  Patient had moderate degenerative disc disease in his neck.  Would recommend referral to Dr. Kelley.

## 2024-09-04 ENCOUNTER — OFFICE VISIT (OUTPATIENT)
Dept: FAMILY MEDICINE | Facility: CLINIC | Age: 54
End: 2024-09-04
Payer: MEDICARE

## 2024-09-04 VITALS
OXYGEN SATURATION: 97 % | BODY MASS INDEX: 31.22 KG/M2 | HEIGHT: 71 IN | RESPIRATION RATE: 18 BRPM | HEART RATE: 81 BPM | SYSTOLIC BLOOD PRESSURE: 136 MMHG | DIASTOLIC BLOOD PRESSURE: 88 MMHG | WEIGHT: 223 LBS

## 2024-09-04 DIAGNOSIS — M50.30 DEGENERATIVE DISC DISEASE, CERVICAL: ICD-10-CM

## 2024-09-04 DIAGNOSIS — M51.36 DEGENERATIVE DISC DISEASE, LUMBAR: ICD-10-CM

## 2024-09-04 DIAGNOSIS — R60.0 EDEMA OF BOTH LEGS: Primary | ICD-10-CM

## 2024-09-04 NOTE — LETTER
[facility]  [address] [place] [address]  Phone: [phone]  Fax: [phone] September 4, 2024    Silas German  [address] [place] [address]      To Whom It May Concern:    Silas German is unable to participate in jury duty due to medical reasons.    If you have any questions or concerns, please feel free to call my office.    Sincerely,        Darion Grant MD

## 2024-09-05 NOTE — PROGRESS NOTES
Silas German is a 54 y.o. male seen today for worsening and persistent bilateral peripheral edema.   Patient also continues to suffer from severe neck pain and low back pain due to documented lumbar and cervical disc disease.    Past Medical History:   Diagnosis Date    Depression     Hyperlipidemia     Insomnia      Family History   Problem Relation Name Age of Onset    Cancer Maternal Grandfather      Heart attack Maternal Grandfather       No current outpatient medications on file prior to visit.     No current facility-administered medications on file prior to visit.       There is no immunization history on file for this patient.    Review of Systems   Constitutional:  Negative for fever, malaise/fatigue and weight loss.   Respiratory:  Negative for shortness of breath.    Cardiovascular:  Positive for leg swelling. Negative for chest pain and palpitations.   Gastrointestinal:  Negative for nausea and vomiting.   Musculoskeletal:  Positive for back pain, myalgias and neck pain.   Psychiatric/Behavioral:  Negative for depression.         Vitals:    24 1347   BP: 136/88   Pulse: 81   Resp: 18       Physical Exam  Vitals reviewed.   Constitutional:       Appearance: Normal appearance.   HENT:      Head: Normocephalic.   Eyes:      Extraocular Movements: Extraocular movements intact.      Conjunctiva/sclera: Conjunctivae normal.      Pupils: Pupils are equal, round, and reactive to light.   Neck:      Thyroid: No thyroid mass or thyromegaly.   Cardiovascular:      Rate and Rhythm: Normal rate and regular rhythm.      Heart sounds: Normal heart sounds. No murmur heard.     No gallop.   Pulmonary:      Effort: Pulmonary effort is normal. No respiratory distress.      Breath sounds: Normal breath sounds. No wheezing or rales.   Musculoskeletal:      Right lower le+ Edema present.      Left lower le+ Edema present.   Skin:     General: Skin is warm and dry.      Coloration: Skin is not jaundiced or  pale.   Neurological:      Mental Status: He is alert.   Psychiatric:         Mood and Affect: Mood normal.         Behavior: Behavior normal.         Thought Content: Thought content normal.         Judgment: Judgment normal.          Assessment and Plan  1. Edema of both legs  -     Ambulatory referral/consult to [facility]; Future; Expected date: 09/11/2024  -     POCT EKG 12-LEAD (Manually Resulted by Ordering Provider)    2. Degenerative disc disease, lumbar  -     Ambulatory referral/consult to Pain Clinic; Future; Expected date: 09/11/2024    3. Degenerative disc disease, cervical  -     Ambulatory referral/consult to Pain Clinic; Future; Expected date: 09/11/2024             Return to clinic in 1-2 months or as needed.    Health Maintenance Topics with due status: Not Due       Topic Last Completion Date    Hemoglobin A1c (Diabetic Prevention Screening) 08/15/2024    Lipid Panel 08/15/2024

## 2024-09-09 DIAGNOSIS — M54.12 CERVICAL RADICULOPATHY: Primary | ICD-10-CM
